# Patient Record
Sex: FEMALE | Race: WHITE | Employment: UNEMPLOYED | URBAN - METROPOLITAN AREA
[De-identification: names, ages, dates, MRNs, and addresses within clinical notes are randomized per-mention and may not be internally consistent; named-entity substitution may affect disease eponyms.]

---

## 2017-05-23 ENCOUNTER — OFFICE VISIT (OUTPATIENT)
Dept: ENDOCRINOLOGY CLINIC | Facility: CLINIC | Age: 27
End: 2017-05-23

## 2017-05-23 VITALS
DIASTOLIC BLOOD PRESSURE: 76 MMHG | WEIGHT: 186 LBS | HEART RATE: 75 BPM | SYSTOLIC BLOOD PRESSURE: 114 MMHG | BODY MASS INDEX: 29.89 KG/M2 | HEIGHT: 66 IN

## 2017-05-23 DIAGNOSIS — E03.9 HYPOTHYROIDISM AFFECTING PREGNANCY IN THIRD TRIMESTER: Primary | ICD-10-CM

## 2017-05-23 DIAGNOSIS — O99.283 HYPOTHYROIDISM AFFECTING PREGNANCY IN THIRD TRIMESTER: Primary | ICD-10-CM

## 2017-05-23 PROCEDURE — 99243 OFF/OP CNSLTJ NEW/EST LOW 30: CPT | Performed by: INTERNAL MEDICINE

## 2017-05-23 PROCEDURE — 99212 OFFICE O/P EST SF 10 MIN: CPT | Performed by: INTERNAL MEDICINE

## 2017-05-23 RX ORDER — FERROUS SULFATE 325(65) MG
TABLET ORAL
Refills: 2 | COMMUNITY
Start: 2017-05-23 | End: 2020-11-17

## 2017-05-23 RX ORDER — PRENATAL VIT/IRON FUM/FOLIC AC 27MG-0.8MG
TABLET ORAL
Refills: 1 | COMMUNITY
Start: 2017-05-11

## 2017-05-23 RX ORDER — LEVOTHYROXINE SODIUM 0.12 MG/1
TABLET ORAL
Refills: 0 | COMMUNITY
Start: 2017-05-11 | End: 2019-05-15

## 2017-05-23 NOTE — H&P
New Patient Evaluation - History and Physical    CONSULT - Reason for Visit: Hypothyroidism in pregnancy  Requesting Physician: Yumi Echevarria MD; Reyna Wilder MD     CHIEF COMPLAINT:    Hypothyroidism in T 3     HISTORY OF PRESENT ILLNESS:   Lynette Fitch ASSESSMENTS:       REVIEW OF SYSTEMS:  Constitutional: Negative for: weight change, fever, fatigue, cold/heat intolerance  Eyes: Negative for:  Visual changes, proptosis, blurring  ENT: Negative for:  dysphagia, neck swelling, dysphonia  Respiratory: needed. Discussed trimester specific reference range for TSH (TSH < 2.5 in T1 and < 3 in T2 and T3)  Stressed the importance of compliance with meds.  She should take levothyroxine every am one hour before breakfast and atleast four hours apart from othe

## 2017-08-08 ENCOUNTER — OFFICE VISIT (OUTPATIENT)
Dept: ENDOCRINOLOGY CLINIC | Facility: CLINIC | Age: 27
End: 2017-08-08

## 2017-08-08 VITALS
BODY MASS INDEX: 25.07 KG/M2 | WEIGHT: 156 LBS | SYSTOLIC BLOOD PRESSURE: 107 MMHG | DIASTOLIC BLOOD PRESSURE: 74 MMHG | HEART RATE: 67 BPM | HEIGHT: 66 IN

## 2017-08-08 DIAGNOSIS — E03.9 HYPOTHYROIDISM, UNSPECIFIED TYPE: Primary | ICD-10-CM

## 2017-08-08 PROCEDURE — 99213 OFFICE O/P EST LOW 20 MIN: CPT | Performed by: INTERNAL MEDICINE

## 2017-08-08 PROCEDURE — 99212 OFFICE O/P EST SF 10 MIN: CPT | Performed by: INTERNAL MEDICINE

## 2017-08-08 NOTE — PROGRESS NOTES
Return Office Visit     CHIEF COMPLAINT:    Hypothyroidism     HISTORY OF PRESENT ILLNESS:  Lynette Everett is a 28year old female who presents for follow up for for hypothyroidism.      She was diagnosed around age 20.   She was on LT 4 50 mcg daily bef Position: Sitting   Cuff Size: adult   Pulse: 67   Weight: 156 lb (70.8 kg)   Height: 5' 6\" (1.676 m)     BMI: Body mass index is 25.18 kg/m².      CONSTITUTIONAL:  awake, alert, cooperative, no apparent distress, and appears stated age  PSYCH: normal affe me as soon as possible. Patient verbalized understanding of above instructions. RTC in one year.                        Orders Placed This Encounter      Assay, Thyroid Stim Hormone      Free T4, (Free Thyroxine)      8/8/2017  Shalom Melendez MD

## 2019-04-23 ENCOUNTER — OFFICE VISIT (OUTPATIENT)
Dept: ENDOCRINOLOGY CLINIC | Facility: CLINIC | Age: 29
End: 2019-04-23
Payer: MEDICAID

## 2019-04-23 VITALS
SYSTOLIC BLOOD PRESSURE: 100 MMHG | DIASTOLIC BLOOD PRESSURE: 63 MMHG | BODY MASS INDEX: 22 KG/M2 | HEART RATE: 72 BPM | WEIGHT: 136.81 LBS

## 2019-04-23 DIAGNOSIS — E03.9 HYPOTHYROIDISM, UNSPECIFIED TYPE: Primary | ICD-10-CM

## 2019-04-23 PROCEDURE — 99213 OFFICE O/P EST LOW 20 MIN: CPT | Performed by: INTERNAL MEDICINE

## 2019-04-23 NOTE — PROGRESS NOTES
Return Office Visit     CHIEF COMPLAINT:    Hypothyroidism     HISTORY OF PRESENT ILLNESS:  Lynette Reyes is a 33year old female who presents for follow up for for hypothyroidism.      She was diagnosed around age 20.   She has been on LT  4 since then PHYSICAL EXAM:    04/23/19  1135   BP: 100/63   Pulse: 72   Weight: 136 lb 12.8 oz (62.1 kg)     BMI: Body mass index is 22.08 kg/m².      CONSTITUTIONAL:  awake, alert, cooperative, no apparent distress, and appears stated age  PSYCH: normal affect

## 2019-05-15 ENCOUNTER — TELEPHONE (OUTPATIENT)
Dept: ENDOCRINOLOGY CLINIC | Facility: CLINIC | Age: 29
End: 2019-05-15

## 2019-05-15 DIAGNOSIS — E03.9 HYPOTHYROIDISM, UNSPECIFIED TYPE: Primary | ICD-10-CM

## 2019-05-15 RX ORDER — LEVOTHYROXINE SODIUM 0.12 MG/1
125 TABLET ORAL
Qty: 30 TABLET | Refills: 2 | Status: SHIPPED | OUTPATIENT
Start: 2019-05-15 | End: 2019-08-20

## 2019-08-19 NOTE — TELEPHONE ENCOUNTER
Current Outpatient Medications:  Levothyroxine Sodium 125 MCG Oral Tab Take 1 tablet (125 mcg total) by mouth before breakfast. Disp: 30 tablet Rfl: 2     Refill pls verify pts insurance plan FYI states pt has insurance we do not accept

## 2019-08-20 RX ORDER — LEVOTHYROXINE SODIUM 0.12 MG/1
125 TABLET ORAL
Qty: 30 TABLET | Refills: 2 | Status: SHIPPED | OUTPATIENT
Start: 2019-08-20 | End: 2020-03-23

## 2019-08-20 RX ORDER — LEVOTHYROXINE SODIUM 0.12 MG/1
TABLET ORAL
Qty: 30 TABLET | Refills: 0 | OUTPATIENT
Start: 2019-08-20

## 2019-10-22 ENCOUNTER — OFFICE VISIT (OUTPATIENT)
Dept: ENDOCRINOLOGY CLINIC | Facility: CLINIC | Age: 29
End: 2019-10-22
Payer: MEDICAID

## 2019-10-22 VITALS
SYSTOLIC BLOOD PRESSURE: 105 MMHG | HEART RATE: 97 BPM | BODY MASS INDEX: 24 KG/M2 | DIASTOLIC BLOOD PRESSURE: 69 MMHG | WEIGHT: 146.63 LBS

## 2019-10-22 DIAGNOSIS — O99.280 HYPOTHYROIDISM DURING PREGNANCY, ANTEPARTUM: Primary | ICD-10-CM

## 2019-10-22 DIAGNOSIS — E03.9 HYPOTHYROIDISM DURING PREGNANCY, ANTEPARTUM: Primary | ICD-10-CM

## 2019-10-22 DIAGNOSIS — N92.6 MISSED PERIOD: ICD-10-CM

## 2019-10-22 PROCEDURE — 81025 URINE PREGNANCY TEST: CPT | Performed by: INTERNAL MEDICINE

## 2019-10-22 PROCEDURE — 99213 OFFICE O/P EST LOW 20 MIN: CPT | Performed by: INTERNAL MEDICINE

## 2019-10-22 RX ORDER — LEVOTHYROXINE SODIUM 0.15 MG/1
150 TABLET ORAL
Qty: 90 TABLET | Refills: 0 | Status: SHIPPED | OUTPATIENT
Start: 2019-10-22 | End: 2020-01-27 | Stop reason: DRUGHIGH

## 2019-10-22 RX ORDER — ERGOCALCIFEROL 1.25 MG/1
CAPSULE ORAL
Refills: 0 | COMMUNITY
Start: 2019-09-16

## 2019-11-21 ENCOUNTER — TELEPHONE (OUTPATIENT)
Dept: ENDOCRINOLOGY CLINIC | Facility: CLINIC | Age: 29
End: 2019-11-21

## 2019-11-26 NOTE — TELEPHONE ENCOUNTER
Called pt. Informed provider wants to see her 2nd trimester. Pt understood. Scheduled apt 1/27/20. Pt aware of office location.

## 2020-01-27 ENCOUNTER — OFFICE VISIT (OUTPATIENT)
Dept: ENDOCRINOLOGY CLINIC | Facility: CLINIC | Age: 30
End: 2020-01-27
Payer: MEDICAID

## 2020-01-27 ENCOUNTER — APPOINTMENT (OUTPATIENT)
Dept: LAB | Facility: HOSPITAL | Age: 30
End: 2020-01-27
Attending: INTERNAL MEDICINE
Payer: MEDICAID

## 2020-01-27 ENCOUNTER — TELEPHONE (OUTPATIENT)
Dept: ENDOCRINOLOGY CLINIC | Facility: CLINIC | Age: 30
End: 2020-01-27

## 2020-01-27 VITALS
WEIGHT: 157.38 LBS | SYSTOLIC BLOOD PRESSURE: 99 MMHG | DIASTOLIC BLOOD PRESSURE: 61 MMHG | HEART RATE: 76 BPM | BODY MASS INDEX: 25 KG/M2

## 2020-01-27 DIAGNOSIS — E03.9 HYPOTHYROIDISM DURING PREGNANCY, ANTEPARTUM: ICD-10-CM

## 2020-01-27 DIAGNOSIS — O99.280 HYPOTHYROIDISM AFFECTING PREGNANCY, ANTEPARTUM: ICD-10-CM

## 2020-01-27 DIAGNOSIS — E03.9 HYPOTHYROIDISM AFFECTING PREGNANCY, ANTEPARTUM: Primary | ICD-10-CM

## 2020-01-27 DIAGNOSIS — O99.280 HYPOTHYROIDISM DURING PREGNANCY, ANTEPARTUM: ICD-10-CM

## 2020-01-27 DIAGNOSIS — E03.9 HYPOTHYROIDISM AFFECTING PREGNANCY, ANTEPARTUM: ICD-10-CM

## 2020-01-27 DIAGNOSIS — O99.280 HYPOTHYROIDISM AFFECTING PREGNANCY, ANTEPARTUM: Primary | ICD-10-CM

## 2020-01-27 DIAGNOSIS — E03.9 HYPOTHYROIDISM, UNSPECIFIED TYPE: ICD-10-CM

## 2020-01-27 LAB
T4 FREE SERPL-MCNC: 1.2 NG/DL (ref 0.8–1.7)
TSI SER-ACNC: 2.91 MIU/ML (ref 0.36–3.74)

## 2020-01-27 PROCEDURE — 99213 OFFICE O/P EST LOW 20 MIN: CPT | Performed by: INTERNAL MEDICINE

## 2020-01-27 PROCEDURE — 84439 ASSAY OF FREE THYROXINE: CPT

## 2020-01-27 PROCEDURE — 36415 COLL VENOUS BLD VENIPUNCTURE: CPT

## 2020-01-27 PROCEDURE — 84443 ASSAY THYROID STIM HORMONE: CPT

## 2020-01-27 NOTE — PROGRESS NOTES
Return Office Visit     CHIEF COMPLAINT:    Hypothyroidism     HISTORY OF PRESENT ILLNESS:  Lynette Mitchell is a 26year old female who presents for follow up for hypothyroidism. She is 19 weeks pregnant.      She was diagnosed around age 20.   She has be Negative for:  depression, anxiety  Hematology/Lymphatics: Negative for: bruising, lower extremity edema  Endocrine: Negative for: polyuria, polydypsia  Skin: Negative for: rash, blister, cellulitis,       PHYSICAL EXAM:    01/27/20  1206   BP: 99/61   Pul haemorrhage can occur in pregnant women with overt hypothyroidism. Also, the offspring of these mothers can have complications such as premature birth, low birth weight and increased  respiratory distress.        RTC in trimester 3  Call for results

## 2020-01-28 NOTE — TELEPHONE ENCOUNTER
Pregnant patient with hypothyroidism  TSH is under 3, which is good   CPm with current dose of LT 4  Repeat labs in 6 weeks and call for results.    FU around 32 weeks, please book  Thanks

## 2020-01-29 NOTE — TELEPHONE ENCOUNTER
Pt called back and message per AM given. pt verbalized understanding FU sched 4/10/20  1045am    Lab work in 6 weeks. TSH T4 pended for approval l  Please advise if any addl labs needed.

## 2020-03-23 RX ORDER — LEVOTHYROXINE SODIUM 0.12 MG/1
TABLET ORAL
Qty: 90 TABLET | Refills: 0 | Status: SHIPPED | OUTPATIENT
Start: 2020-03-23 | End: 2021-06-30

## 2020-04-10 ENCOUNTER — VIRTUAL PHONE E/M (OUTPATIENT)
Dept: ENDOCRINOLOGY CLINIC | Facility: CLINIC | Age: 30
End: 2020-04-10
Payer: MEDICAID

## 2020-04-10 DIAGNOSIS — O99.280 HYPOTHYROIDISM AFFECTING PREGNANCY, ANTEPARTUM: Primary | ICD-10-CM

## 2020-04-10 DIAGNOSIS — E03.9 HYPOTHYROIDISM AFFECTING PREGNANCY, ANTEPARTUM: Primary | ICD-10-CM

## 2020-04-10 PROCEDURE — 99212 OFFICE O/P EST SF 10 MIN: CPT | Performed by: INTERNAL MEDICINE

## 2020-04-10 NOTE — PROGRESS NOTES
Virtual Telephone Check-In    Lynette Bales verbally consents to a Virtual/Telephone Check-In visit on 04/10/20. Patient understands and accepts financial responsibility for any deductible, co-insurance and/or co-pays associated with this service. especially calcium, iron, multivitamins containing Ca/iron  Discussed that the fetus is dependent on maternal thyroid hormone specially in T1 and that thyroid hormone is important for normal brain development and well being of the fetus.   Maternal complica

## 2020-04-22 RX ORDER — LEVOTHYROXINE SODIUM 0.15 MG/1
TABLET ORAL
Qty: 90 TABLET | Refills: 0 | Status: SHIPPED | OUTPATIENT
Start: 2020-04-22 | End: 2020-11-17

## 2020-07-17 RX ORDER — LEVOTHYROXINE SODIUM 0.15 MG/1
TABLET ORAL
Qty: 90 TABLET | Refills: 0 | OUTPATIENT
Start: 2020-07-17

## 2020-07-17 NOTE — TELEPHONE ENCOUNTER
Patient was to go back to LT 4 125 mcg daily after delivery  She probably delivered  If yes, then should to 125 mcg daily  Please confirm and refill accordingly  TSh and Free T4 in 8-10 weeks from delivery and FU in clinic  Thanks , can book video visit if

## 2020-07-17 NOTE — TELEPHONE ENCOUNTER
LOV 4/10/20 -RTC 8-10 wks after delivery  No F/U     Pt was 29 weeks pregnant at [address] [place] [address] on 4/10/20

## 2020-08-04 NOTE — TELEPHONE ENCOUNTER
Booked apt 8/18/20. Pt reports she just left her OBGYN office (Dr. Delio Petty) and she requested that labs be sent to our clinic. Called Dr. Delio Petty office to confirm if labs have been sent to correct fax number.  Was advised that they are currently being proces

## 2020-08-07 NOTE — TELEPHONE ENCOUNTER
Called Lynette:    No answer, LMTCBULMARO BOLTON reviewed fax from [address] [place] [address]. Need to confirm dosage that patient is currently taking of Levothyroxine. Per Dr. Tanner Quiroz, if she is taking 150mcg, HOLD for 1 week, and start on 125 mcg.     Ro

## 2020-11-13 ENCOUNTER — LAB ENCOUNTER (OUTPATIENT)
Dept: LAB | Age: 30
End: 2020-11-13
Attending: INTERNAL MEDICINE
Payer: MEDICAID

## 2020-11-13 DIAGNOSIS — O99.280 HYPOTHYROIDISM AFFECTING PREGNANCY, ANTEPARTUM: ICD-10-CM

## 2020-11-13 DIAGNOSIS — E03.9 HYPOTHYROIDISM AFFECTING PREGNANCY, ANTEPARTUM: ICD-10-CM

## 2020-11-13 PROCEDURE — 36415 COLL VENOUS BLD VENIPUNCTURE: CPT

## 2020-11-13 PROCEDURE — 84439 ASSAY OF FREE THYROXINE: CPT

## 2020-11-13 PROCEDURE — 84443 ASSAY THYROID STIM HORMONE: CPT

## 2020-11-17 ENCOUNTER — TELEMEDICINE (OUTPATIENT)
Dept: ENDOCRINOLOGY CLINIC | Facility: CLINIC | Age: 30
End: 2020-11-17
Payer: MEDICAID

## 2020-11-17 DIAGNOSIS — E03.9 HYPOTHYROIDISM, UNSPECIFIED TYPE: Primary | ICD-10-CM

## 2020-11-17 PROBLEM — O99.280 HYPOTHYROIDISM AFFECTING PREGNANCY: Status: RESOLVED | Noted: 2019-04-23 | Resolved: 2020-11-17

## 2020-11-17 PROCEDURE — 99213 OFFICE O/P EST LOW 20 MIN: CPT | Performed by: INTERNAL MEDICINE

## 2020-11-17 NOTE — PROGRESS NOTES
Telehealth outside of [employer] Verbal Consent   I conducted a telehealth visit with Belkis Isaac today, 11/17/20, which was completed using two-way, real-time interactive audio and video communication.  This has been done in good merari to pr except hair loss             CURRENT MEDICATION:    Current Outpatient Medications   Medication Sig Dispense Refill   • LEVOTHYROXINE SODIUM 125 MCG Oral Tab TAKE 1 TABLET(125 MCG) BY MOUTH BEFORE BREAKFAST 90 tablet 0   • ergocalciferol 20834 units Oral C extremities without difficulty  Psychiatric:  oriented to time, self, and place  Extremities: no obvious extremity swelling, no lesions    DATA:     Pertinent data reviewed    ASSESSMENT AND PLAN:      Patient is a 31year old female with Hypothyroidism  C

## 2021-04-28 ENCOUNTER — TELEPHONE (OUTPATIENT)
Dept: ENDOCRINOLOGY CLINIC | Facility: CLINIC | Age: 31
End: 2021-04-28

## 2021-04-28 NOTE — TELEPHONE ENCOUNTER
Noted  I will look at her labs but self adjustment of doses can be dangerous and is not recommended  To please call if she runs out  Thanks

## 2021-04-28 NOTE — TELEPHONE ENCOUNTER
Called patient and relayed below message as outlined. She voiced understanding and denied further question at this time. She is planning on getting her blood test done tomorrow at an offsite [address] [place] [address] lab location.   She asked if she should continue with [facility]

## 2021-04-28 NOTE — TELEPHONE ENCOUNTER
Dr. Deborah Cuevas)  Patient states she was taking 125mcg levothyroxine -ran out about 1 month ago - she c/o fatigue at end of day - states she had extra 150mcg levothyroxine so has been taking that for last month and feels better    Patient has active lab

## 2021-04-30 ENCOUNTER — LAB ENCOUNTER (OUTPATIENT)
Dept: LAB | Age: 31
End: 2021-04-30
Attending: INTERNAL MEDICINE
Payer: MEDICAID

## 2021-04-30 DIAGNOSIS — E03.9 HYPOTHYROIDISM, UNSPECIFIED TYPE: ICD-10-CM

## 2021-04-30 PROCEDURE — 84443 ASSAY THYROID STIM HORMONE: CPT

## 2021-04-30 PROCEDURE — 36415 COLL VENOUS BLD VENIPUNCTURE: CPT

## 2021-04-30 PROCEDURE — 84439 ASSAY OF FREE THYROXINE: CPT

## 2021-05-02 ENCOUNTER — TELEPHONE (OUTPATIENT)
Dept: ENDOCRINOLOGY CLINIC | Facility: CLINIC | Age: 31
End: 2021-05-02

## 2021-05-02 DIAGNOSIS — E03.9 HYPOTHYROIDISM, UNSPECIFIED TYPE: Primary | ICD-10-CM

## 2021-05-03 RX ORDER — LEVOTHYROXINE SODIUM 0.15 MG/1
150 TABLET ORAL
Qty: 90 TABLET | Refills: 0 | Status: SHIPPED | OUTPATIENT
Start: 2021-05-03 | End: 2021-06-30

## 2021-05-03 NOTE — TELEPHONE ENCOUNTER
Spoke to patient to relay message below - patient stated understanding to continue 150mcg levothyroxine daily and repeat labs prior to apt 6/30/21    Labs ordered  RX sent to [facility] per patient request

## 2021-06-28 ENCOUNTER — LAB ENCOUNTER (OUTPATIENT)
Dept: LAB | Age: 31
End: 2021-06-28
Attending: INTERNAL MEDICINE
Payer: MEDICAID

## 2021-06-28 DIAGNOSIS — E03.9 HYPOTHYROIDISM, UNSPECIFIED TYPE: ICD-10-CM

## 2021-06-28 LAB
T4 FREE SERPL-MCNC: 1.1 NG/DL (ref 0.8–1.7)
TSI SER-ACNC: 1.88 MIU/ML (ref 0.36–3.74)

## 2021-06-28 PROCEDURE — 84439 ASSAY OF FREE THYROXINE: CPT

## 2021-06-28 PROCEDURE — 84443 ASSAY THYROID STIM HORMONE: CPT

## 2021-06-28 PROCEDURE — 36415 COLL VENOUS BLD VENIPUNCTURE: CPT

## 2021-06-30 ENCOUNTER — OFFICE VISIT (OUTPATIENT)
Dept: ENDOCRINOLOGY CLINIC | Facility: CLINIC | Age: 31
End: 2021-06-30
Payer: MEDICAID

## 2021-06-30 VITALS
WEIGHT: 133 LBS | HEART RATE: 64 BPM | BODY MASS INDEX: 21 KG/M2 | DIASTOLIC BLOOD PRESSURE: 71 MMHG | SYSTOLIC BLOOD PRESSURE: 110 MMHG

## 2021-06-30 DIAGNOSIS — E03.9 HYPOTHYROIDISM, UNSPECIFIED TYPE: Primary | ICD-10-CM

## 2021-06-30 PROCEDURE — 3078F DIAST BP <80 MM HG: CPT | Performed by: INTERNAL MEDICINE

## 2021-06-30 PROCEDURE — 3074F SYST BP LT 130 MM HG: CPT | Performed by: INTERNAL MEDICINE

## 2021-06-30 PROCEDURE — 99213 OFFICE O/P EST LOW 20 MIN: CPT | Performed by: INTERNAL MEDICINE

## 2021-06-30 RX ORDER — LEVOTHYROXINE SODIUM 0.15 MG/1
150 TABLET ORAL
Qty: 90 TABLET | Refills: 1 | Status: SHIPPED | OUTPATIENT
Start: 2021-06-30 | End: 2021-10-19

## 2021-06-30 NOTE — PROGRESS NOTES
Return Office Visit     CHIEF COMPLAINT:    Hypothyroidism     HISTORY OF PRESENT ILLNESS:  Rosi Trey Kocher is a 27year old female who presents for follow up for hypothyroidism     She was diagnosed around age 20. She has been on LT  4 since then. sound to voice. Normal hearing, normal speech  Respiratory:  Speaking in full sentences, non-labored.  no increased work of breathing, no audible wheezing    Skin:  normal moisture and skin texture, no visible lesions  Hematologic:  no excessive bruising  N

## 2021-10-19 ENCOUNTER — PATIENT MESSAGE (OUTPATIENT)
Dept: ENDOCRINOLOGY CLINIC | Facility: CLINIC | Age: 31
End: 2021-10-19

## 2021-10-19 DIAGNOSIS — E03.9 HYPOTHYROIDISM, UNSPECIFIED TYPE: ICD-10-CM

## 2021-10-19 RX ORDER — LEVOTHYROXINE SODIUM 0.15 MG/1
150 TABLET ORAL
Qty: 90 TABLET | Refills: 0 | Status: SHIPPED | OUTPATIENT
Start: 2021-10-19 | End: 2022-01-27

## 2021-10-19 NOTE — TELEPHONE ENCOUNTER
From: Lynette Bales  To: Ariela France MD  Sent: 10/19/2021 1:47 PM CDT  Subject: [facility] [address]. Can I have a request for my blood work. I have appointment on Saturday 23rd for my ultra sound.

## 2021-10-19 NOTE — TELEPHONE ENCOUNTER
Per chart review, there is a lab order for TSH + FT4 dated 6/30/21. Responded to patient with this information.

## 2021-10-23 ENCOUNTER — LAB ENCOUNTER (OUTPATIENT)
Dept: LAB | Age: 31
End: 2021-10-23
Attending: INTERNAL MEDICINE
Payer: MEDICAID

## 2021-10-23 ENCOUNTER — HOSPITAL ENCOUNTER (OUTPATIENT)
Dept: ULTRASOUND IMAGING | Age: 31
Discharge: HOME | End: 2021-10-23
Attending: INTERNAL MEDICINE
Payer: MEDICAID

## 2021-10-23 DIAGNOSIS — E03.9 HYPOTHYROIDISM, UNSPECIFIED TYPE: ICD-10-CM

## 2021-10-23 PROCEDURE — 84443 ASSAY THYROID STIM HORMONE: CPT

## 2021-10-23 PROCEDURE — 36415 COLL VENOUS BLD VENIPUNCTURE: CPT

## 2021-10-23 PROCEDURE — 84439 ASSAY OF FREE THYROXINE: CPT

## 2021-10-23 PROCEDURE — 76536 US EXAM OF HEAD AND NECK: CPT | Performed by: INTERNAL MEDICINE

## 2021-12-27 ENCOUNTER — HOSPITAL ENCOUNTER (EMERGENCY)
Facility: HOSPITAL | Age: 31
Discharge: HOME HEALTH | End: 2021-12-27
Payer: MEDICAID

## 2022-01-01 NOTE — PROGRESS NOTES
Return Office Visit     CHIEF COMPLAINT:    Hypothyroidism     HISTORY OF PRESENT ILLNESS:  Lynette Atwood is a 26year old female who presents for follow up for for hypothyroidism.      She was diagnosed around age 20.   She has been on LT  4 since then anxiety  Hematology/Lymphatics: Negative for: bruising, lower extremity edema  Endocrine: Negative for: polyuria, polydypsia  Skin: Negative for: rash, blister, cellulitis,       PHYSICAL EXAM:    10/22/19  1131   BP: 105/69   Pulse: 97   Weight: 146 lb 9. N/A pregnant women with overt hypothyroidism. Also, the offspring of these mothers can have complications such as premature birth, low birth weight and increased  respiratory distress.        RTC in trimester 2  Call for results     Patient verbalized a

## 2022-01-27 ENCOUNTER — TELEPHONE (OUTPATIENT)
Dept: ENDOCRINOLOGY CLINIC | Facility: CLINIC | Age: 32
End: 2022-01-27

## 2022-01-27 DIAGNOSIS — E03.9 HYPOTHYROIDISM, UNSPECIFIED TYPE: ICD-10-CM

## 2022-01-27 RX ORDER — LEVOTHYROXINE SODIUM 0.15 MG/1
TABLET ORAL
Qty: 90 TABLET | Refills: 0 | Status: SHIPPED | OUTPATIENT
Start: 2022-01-27

## 2022-08-18 ENCOUNTER — TELEPHONE (OUTPATIENT)
Dept: ENDOCRINOLOGY CLINIC | Facility: CLINIC | Age: 32
End: 2022-08-18

## 2022-08-18 DIAGNOSIS — E03.9 HYPOTHYROIDISM, UNSPECIFIED TYPE: Primary | ICD-10-CM

## 2022-08-19 NOTE — TELEPHONE ENCOUNTER
Please move up apt to next month , okay to add on   Labs 3-5 days before apt  TSH and Free T4  Thanks

## 2022-08-22 NOTE — TELEPHONE ENCOUNTER
Spoke to patient and scheduled for video visit next week. Pt was instructed to get labs done a few days prior to the appointment. She voiced understanding and denied questions at this time. Lab orders entered in the system.

## 2022-08-22 NOTE — TELEPHONE ENCOUNTER
Spoke to patient, we can reschedule her 10/25 appt to  9/26 at 12:00pm (add on), However patient states that she only has about 11 pills left. Should she wait until prior to the 9/26 appt to have TFT's done  And we can sent her a refill to last until then, or should she have labs done next week and wait for the medication refill to follow? Please advice, Thank you.

## 2022-08-24 ENCOUNTER — LAB ENCOUNTER (OUTPATIENT)
Dept: LAB | Age: 32
End: 2022-08-24
Attending: INTERNAL MEDICINE
Payer: MEDICAID

## 2022-08-24 DIAGNOSIS — E03.9 HYPOTHYROIDISM, UNSPECIFIED TYPE: ICD-10-CM

## 2022-08-24 LAB
T4 FREE SERPL-MCNC: 1.4 NG/DL (ref 0.8–1.7)
TSI SER-ACNC: 0.1 MIU/ML (ref 0.36–3.74)

## 2022-08-24 PROCEDURE — 84443 ASSAY THYROID STIM HORMONE: CPT

## 2022-08-24 PROCEDURE — 84439 ASSAY OF FREE THYROXINE: CPT

## 2022-08-24 PROCEDURE — 36415 COLL VENOUS BLD VENIPUNCTURE: CPT

## 2022-08-31 ENCOUNTER — TELEMEDICINE (OUTPATIENT)
Dept: ENDOCRINOLOGY CLINIC | Facility: CLINIC | Age: 32
End: 2022-08-31

## 2022-08-31 DIAGNOSIS — E03.9 HYPOTHYROIDISM, UNSPECIFIED TYPE: Primary | ICD-10-CM

## 2022-08-31 RX ORDER — LEVOTHYROXINE SODIUM 137 UG/1
137 TABLET ORAL
Qty: 90 TABLET | Refills: 0 | Status: SHIPPED | OUTPATIENT
Start: 2022-08-31

## 2022-12-10 NOTE — TELEPHONE ENCOUNTER
LMTCB
Patient is on LT 4 125 mcg daily  Thyroid labs indicate need for further dose increase   Please also ensure compliance ( labs got worse inspite last dose increase).    If she is compliant, then let us go up to 150 mcg daily  TSH and Free T4 in 6 weeks  Plea
Spoke w/ Lynette. She verbalizes understanding of lab result and does confirm correct compliance with medication. Denies missing any doses. However, her currernt dose is LT4 112 mcg PO daily, not 125 mcg.  She states she's only been taking that dose for about
12-Dec-2022

## 2023-01-05 RX ORDER — LEVOTHYROXINE SODIUM 137 UG/1
137 TABLET ORAL
Qty: 90 TABLET | Refills: 0 | Status: SHIPPED | OUTPATIENT
Start: 2023-01-05

## 2023-01-05 NOTE — TELEPHONE ENCOUNTER
LOV: 08/31/22    RTC:10-12 Months     FU: No FU Appt Scheduled      Last Refill: 08/31/22      3 Month Supply Pending

## 2023-02-03 ENCOUNTER — PATIENT MESSAGE (OUTPATIENT)
Dept: ENDOCRINOLOGY CLINIC | Facility: CLINIC | Age: 33
End: 2023-02-03

## 2023-02-03 NOTE — TELEPHONE ENCOUNTER
From: Lynette Bales  To: Daisy Amador MD  Sent: 2/3/2023 1:00 PM CST  Subject: [id number]. I just went to my OB/GYN and they did some lab work my TSH 0.229 low.

## 2023-02-04 NOTE — TELEPHONE ENCOUNTER
Routed to provider in error. Appointment booked as below.      Future Appointments   Date Time Provider Jenni Colbert   2/7/2023 12:45 PM Chana Winters MD [address] [place] [address]

## 2023-02-07 ENCOUNTER — TELEMEDICINE (OUTPATIENT)
Dept: ENDOCRINOLOGY CLINIC | Facility: CLINIC | Age: 33
End: 2023-02-07

## 2023-02-07 DIAGNOSIS — E03.9 HYPOTHYROIDISM, UNSPECIFIED TYPE: Primary | ICD-10-CM

## 2023-02-07 PROCEDURE — 99213 OFFICE O/P EST LOW 20 MIN: CPT | Performed by: INTERNAL MEDICINE

## 2023-02-07 RX ORDER — LEVOTHYROXINE SODIUM 112 UG/1
112 TABLET ORAL
Qty: 90 TABLET | Refills: 0 | Status: SHIPPED | OUTPATIENT
Start: 2023-02-07

## (undated) NOTE — MR AVS SNAPSHOT
[address]  [phone]               Thank you for choosing us for your health care visit with Ozzie Bray MD.  We are glad to serve you and happy to provide you with this summary of your visit. Healthy Diet and Regular Exercise  [employer] [address] [place], [place] [address] for making healthy food choices  -   Enjoy your food, but eat less. Fully enjoy your food when eating. Don’t eat while distracted and slow down. Avoid over sized portions.    Karson Louis

## (undated) NOTE — Clinical Note
Please send a copy of my note to patient;s PCP ( first name) and OB ( second name, is in [place]).  Thx.

## (undated) NOTE — LETTER
7/26/2019              Lynette Bales        [address]        [facility] [address]         Dear Sima Tyler,    Our records indicate that the repeat thyroid blood tests ordered for you by Ozzie Bray MD  have not been done.   If you have, i